# Patient Record
Sex: FEMALE | Race: WHITE | NOT HISPANIC OR LATINO | Employment: UNEMPLOYED | URBAN - METROPOLITAN AREA
[De-identification: names, ages, dates, MRNs, and addresses within clinical notes are randomized per-mention and may not be internally consistent; named-entity substitution may affect disease eponyms.]

---

## 2017-03-13 ENCOUNTER — HOSPITAL ENCOUNTER (EMERGENCY)
Facility: HOSPITAL | Age: 57
Discharge: HOME | End: 2017-03-14
Attending: EMERGENCY MEDICINE | Admitting: EMERGENCY MEDICINE

## 2017-03-13 DIAGNOSIS — F10.929 ALCOHOL INTOXICATION, WITH UNSPECIFIED COMPLICATION (HCC): Primary | ICD-10-CM

## 2017-03-13 LAB
ALBUMIN SERPL-MCNC: 4.2 G/DL (ref 3.5–5.2)
ALBUMIN/GLOB SERPL: 1.5 G/DL
ALP SERPL-CCNC: 84 U/L (ref 39–117)
ALT SERPL W P-5'-P-CCNC: 174 U/L (ref 1–33)
AMPHET+METHAMPHET UR QL: NEGATIVE
ANION GAP SERPL CALCULATED.3IONS-SCNC: 22.6 MMOL/L
APAP SERPL-MCNC: <5 MCG/ML (ref 10–30)
AST SERPL-CCNC: 294 U/L (ref 1–32)
BARBITURATES UR QL SCN: NEGATIVE
BASOPHILS # BLD AUTO: 0.04 10*3/MM3 (ref 0–0.2)
BASOPHILS NFR BLD AUTO: 0.9 % (ref 0–1.5)
BENZODIAZ UR QL SCN: NEGATIVE
BILIRUB SERPL-MCNC: 0.3 MG/DL (ref 0.1–1.2)
BUN BLD-MCNC: 8 MG/DL (ref 6–20)
BUN/CREAT SERPL: 16 (ref 7–25)
CALCIUM SPEC-SCNC: 8.6 MG/DL (ref 8.6–10.5)
CANNABINOIDS SERPL QL: NEGATIVE
CHLORIDE SERPL-SCNC: 103 MMOL/L (ref 98–107)
CO2 SERPL-SCNC: 23.4 MMOL/L (ref 22–29)
COCAINE UR QL: NEGATIVE
CREAT BLD-MCNC: 0.5 MG/DL (ref 0.57–1)
DEPRECATED RDW RBC AUTO: 56.9 FL (ref 37–54)
EOSINOPHIL # BLD AUTO: 0.05 10*3/MM3 (ref 0–0.7)
EOSINOPHIL NFR BLD AUTO: 1.1 % (ref 0.3–6.2)
ERYTHROCYTE [DISTWIDTH] IN BLOOD BY AUTOMATED COUNT: 15.7 % (ref 11.7–13)
ETHANOL BLD-MCNC: 172 MG/DL (ref 0–10)
ETHANOL BLD-MCNC: 429 MG/DL (ref 0–10)
ETHANOL UR QL: 0.17 %
ETHANOL UR QL: 0.43 %
GFR SERPL CREATININE-BSD FRML MDRD: 128 ML/MIN/1.73
GLOBULIN UR ELPH-MCNC: 2.8 GM/DL
GLUCOSE BLD-MCNC: 92 MG/DL (ref 65–99)
GLUCOSE BLDC GLUCOMTR-MCNC: 91 MG/DL (ref 70–130)
HCG SERPL QL: NEGATIVE
HCT VFR BLD AUTO: 41.2 % (ref 35.6–45.5)
HGB BLD-MCNC: 14.2 G/DL (ref 11.9–15.5)
HOLD SPECIMEN: NORMAL
HOLD SPECIMEN: NORMAL
IMM GRANULOCYTES # BLD: 0 10*3/MM3 (ref 0–0.03)
IMM GRANULOCYTES NFR BLD: 0 % (ref 0–0.5)
LYMPHOCYTES # BLD AUTO: 1.46 10*3/MM3 (ref 0.9–4.8)
LYMPHOCYTES NFR BLD AUTO: 31.9 % (ref 19.6–45.3)
MCH RBC QN AUTO: 34 PG (ref 26.9–32)
MCHC RBC AUTO-ENTMCNC: 34.5 G/DL (ref 32.4–36.3)
MCV RBC AUTO: 98.6 FL (ref 80.5–98.2)
METHADONE UR QL SCN: NEGATIVE
MONOCYTES # BLD AUTO: 0.53 10*3/MM3 (ref 0.2–1.2)
MONOCYTES NFR BLD AUTO: 11.6 % (ref 5–12)
NEUTROPHILS # BLD AUTO: 2.5 10*3/MM3 (ref 1.9–8.1)
NEUTROPHILS NFR BLD AUTO: 54.5 % (ref 42.7–76)
OPIATES UR QL: NEGATIVE
OXYCODONE UR QL SCN: NEGATIVE
PLATELET # BLD AUTO: 122 10*3/MM3 (ref 140–500)
PMV BLD AUTO: 10.4 FL (ref 6–12)
POTASSIUM BLD-SCNC: 3.3 MMOL/L (ref 3.5–5.2)
PROT SERPL-MCNC: 7 G/DL (ref 6–8.5)
RBC # BLD AUTO: 4.18 10*6/MM3 (ref 3.9–5.2)
SALICYLATES SERPL-MCNC: <0.3 MG/DL
SODIUM BLD-SCNC: 149 MMOL/L (ref 136–145)
WBC NRBC COR # BLD: 4.58 10*3/MM3 (ref 4.5–10.7)
WHOLE BLOOD HOLD SPECIMEN: NORMAL
WHOLE BLOOD HOLD SPECIMEN: NORMAL

## 2017-03-13 PROCEDURE — 80307 DRUG TEST PRSMV CHEM ANLYZR: CPT | Performed by: PHYSICIAN ASSISTANT

## 2017-03-13 PROCEDURE — 96365 THER/PROPH/DIAG IV INF INIT: CPT

## 2017-03-13 PROCEDURE — 82962 GLUCOSE BLOOD TEST: CPT

## 2017-03-13 PROCEDURE — 25010000002 MAGNESIUM SULFATE PER 500 MG OF MAGNESIUM: Performed by: PHYSICIAN ASSISTANT

## 2017-03-13 PROCEDURE — 99285 EMERGENCY DEPT VISIT HI MDM: CPT

## 2017-03-13 PROCEDURE — 25810000003 DEXTROSE-NACL PER 500 ML: Performed by: PHYSICIAN ASSISTANT

## 2017-03-13 PROCEDURE — 36415 COLL VENOUS BLD VENIPUNCTURE: CPT

## 2017-03-13 PROCEDURE — 84703 CHORIONIC GONADOTROPIN ASSAY: CPT | Performed by: PHYSICIAN ASSISTANT

## 2017-03-13 PROCEDURE — 85025 COMPLETE CBC W/AUTO DIFF WBC: CPT | Performed by: PHYSICIAN ASSISTANT

## 2017-03-13 PROCEDURE — 80053 COMPREHEN METABOLIC PANEL: CPT | Performed by: PHYSICIAN ASSISTANT

## 2017-03-13 PROCEDURE — 25010000002 THIAMINE PER 100 MG: Performed by: PHYSICIAN ASSISTANT

## 2017-03-13 RX ORDER — SODIUM CHLORIDE 0.9 % (FLUSH) 0.9 %
10 SYRINGE (ML) INJECTION AS NEEDED
Status: DISCONTINUED | OUTPATIENT
Start: 2017-03-13 | End: 2017-03-14 | Stop reason: HOSPADM

## 2017-03-13 RX ADMIN — FOLIC ACID 1000 ML/HR: 5 INJECTION, SOLUTION INTRAMUSCULAR; INTRAVENOUS; SUBCUTANEOUS at 15:12

## 2017-03-13 NOTE — ED PROVIDER NOTES
I supervised care provided by the midlevel provider.    We have discussed this patient's history, physical exam, and treatment plan.   I have reviewed the note and personally saw and examined the patient and agree with the plan of care.      Pt with h/o EtOH abuse presents to the ED due to depression and persistent EtOH intoxication. Pt reports that she last drank EtOH PTA. Pt has also had passive suicidal ideations, but has no definite plan. On physical exam, pt appears intoxicated. Heart is RRR. Lungs are CTAB.        PROGRESS NOTES:    2:41 PM: OBSERVATION SERVICES    The patient was admitted to observation status at 14:41 in order to evaluate for risk of ETOH withdrawal and deterioration of condition which may require admission.     Family/Past/Social history: Family hx: pt's brother has diabetes; Past medical hx: depression; Social hx: Pt smokes 1 ppd and drinks ETOH regularly.  Please see emergency department note for additional history and physical.      8:11 PM: Pt's BAL is 429. Course of care turned over to Mode Staton PA-C and Dr. Stewart. Pending pt's sobriety and ACCESS consult to determine disposition.                Documentation assistance provided by Shelbie Ashley. Information recorded by the scribe was done at my direction and has been verified and validated by me.     Entered by Shelbie Ashely, acting as scribe for Dr. Cora MD.             Shelbie Ashley  03/13/17 2013       Olvin Shepherd MD  03/13/17 2024

## 2017-03-13 NOTE — ED NOTES
"Pt reports history of etoh abuse and dependency.  Pt denies intent to harm self or conversation about wanting to harm self. When conversing with EMS pt stated with this writer present \" I wish I would not wake up but I always do.\"  Pt denies specific plan to harm self. Per EMS pt was drinking heavily when they arrived.     Claudia Adhikari RN  03/13/17 9033    "

## 2017-03-13 NOTE — ED NOTES
Pt to room 7. Report to charge nurse Jorge LAGUNA, .  Report to HUGO Perry RN.  Security notified.      Claudia Adhikari RN  03/13/17 6133

## 2017-03-13 NOTE — ED PROVIDER NOTES
EMERGENCY DEPARTMENT ENCOUNTER    CHIEF COMPLAINT  Chief Complaint: SI  History given by: patient   History limited by: intoxication   Room Number: 07/07  PMD: No Known Provider    HPI:  Pt is a 56 y.o. female with a h/o EtOH abuse who presents with SI. Pt reports she has been on her current drinking binge since January. Pt last ate yesterday. Pt is seeking help for EtOH dependency. She has been seen at the [facility] in the past. Pt drank whiskey today and appears intoxicated.     Duration: drinking binge since January  Timing: constant   Location: generalized   Radiation: does not radiate   Quality: recurrent   Intensity/Severity: moderate   Progression: unchanged   Associated Symptoms: none specified   Aggravating Factors: none specified   Alleviating Factors: none specified   Previous Episodes: yes  Treatment before arrival: previously seen at [facility]     MEDICAL RECORD REVIEW  Pt has a h/o anxiety, depression, and alcohol dependence. She does not list any medications. She does not have any previous admissions to [facility], [place], operated by [facility].     PAST MEDICAL HISTORY  Active Ambulatory Problems     Diagnosis Date Noted   • No Active Ambulatory Problems     Resolved Ambulatory Problems     Diagnosis Date Noted   • No Resolved Ambulatory Problems     Past Medical History   Diagnosis Date   • Alcohol abuse    • Anxiety    • Depression        PAST SURGICAL HISTORY  Past Surgical History   Procedure Laterality Date   • Tubal abdominal ligation         FAMILY HISTORY  Family History   Problem Relation Age of Onset   • Family history unknown: Yes       SOCIAL HISTORY  Social History     Social History   • Marital status: Single     Spouse name: N/A   • Number of children: N/A   • Years of education: N/A     Occupational History   • Not on file.     Social History Main Topics   • Smoking status: Heavy Tobacco Smoker     Packs/day: 1.00     Types: Cigarettes   • Smokeless tobacco: Not on file   • Alcohol use Yes   • Drug use: Defer    • Sexual activity: Defer     Other Topics Concern   • Not on file     Social History Narrative   • No narrative on file       ALLERGIES  Review of patient's allergies indicates no known allergies.    REVIEW OF SYSTEMS  Review of Systems   Unable to perform ROS: Other   Psychiatric/Behavioral: Positive for suicidal ideas.        EtOH dependency       PHYSICAL EXAM  ED Triage Vitals   Temp Heart Rate Resp BP SpO2   03/13/17 1323 03/13/17 1323 03/13/17 1323 03/13/17 1323 03/13/17 1323   96.1 °F (35.6 °C) 82 19 119/80 96 %      Temp src Heart Rate Source Patient Position BP Location FiO2 (%)   03/13/17 1323 03/13/17 1323 03/13/17 1323 03/13/17 1323 --   Tympanic Monitor Lying Right arm        Physical Exam   Constitutional: She is well-developed, well-nourished, and in no distress. No distress.   Smells of EtOH. Slurs speech.    HENT:   Head: Normocephalic and atraumatic.   Mouth/Throat: Mucous membranes are dry.   Eyes: EOM are normal.   Neck: Normal range of motion. Neck supple.   Cardiovascular: Normal rate and regular rhythm.    HR: 94  BP: 113/78   Pulmonary/Chest: Effort normal and breath sounds normal. No respiratory distress. She has no wheezes. She exhibits no tenderness.   O2 sats: 99% on room air.    Abdominal: Soft. She exhibits no distension. There is no tenderness. There is no rebound.   Musculoskeletal: Normal range of motion. She exhibits no edema.   Lymphadenopathy:     She has no cervical adenopathy.   Neurological: She is alert.   Skin: Skin is warm and dry. No rash noted. No pallor.   Nursing note and vitals reviewed.      LAB RESULTS  Recent Results (from the past 24 hour(s))   Comprehensive Metabolic Panel    Collection Time: 03/13/17  1:53 PM   Result Value Ref Range    Glucose 92 65 - 99 mg/dL    BUN 8 6 - 20 mg/dL    Creatinine 0.50 (L) 0.57 - 1.00 mg/dL    Sodium 149 (H) 136 - 145 mmol/L    Potassium 3.3 (L) 3.5 - 5.2 mmol/L    Chloride 103 98 - 107 mmol/L    CO2 23.4 22.0 - 29.0 mmol/L     Calcium 8.6 8.6 - 10.5 mg/dL    Total Protein 7.0 6.0 - 8.5 g/dL    Albumin 4.20 3.50 - 5.20 g/dL    ALT (SGPT) 174 (H) 1 - 33 U/L    AST (SGOT) 294 (H) 1 - 32 U/L    Alkaline Phosphatase 84 39 - 117 U/L    Total Bilirubin 0.3 0.1 - 1.2 mg/dL    eGFR Non African Amer 128 >60 mL/min/1.73    Globulin 2.8 gm/dL    A/G Ratio 1.5 g/dL    BUN/Creatinine Ratio 16.0 7.0 - 25.0    Anion Gap 22.6 mmol/L   Acetaminophen Level    Collection Time: 03/13/17  1:53 PM   Result Value Ref Range    Acetaminophen <5.0 (L) 10.0 - 30.0 mcg/mL   Ethanol    Collection Time: 03/13/17  1:53 PM   Result Value Ref Range    Ethanol 429 (C) 0 - 10 mg/dL    Ethanol % 0.429 %   Salicylate Level    Collection Time: 03/13/17  1:53 PM   Result Value Ref Range    Salicylate <0.3 mg/dL   hCG, Serum, Qualitative    Collection Time: 03/13/17  1:53 PM   Result Value Ref Range    HCG Qualitative Negative Indeterminate, Negative   CBC Auto Differential    Collection Time: 03/13/17  1:53 PM   Result Value Ref Range    WBC 4.58 4.50 - 10.70 10*3/mm3    RBC 4.18 3.90 - 5.20 10*6/mm3    Hemoglobin 14.2 11.9 - 15.5 g/dL    Hematocrit 41.2 35.6 - 45.5 %    MCV 98.6 (H) 80.5 - 98.2 fL    MCH 34.0 (H) 26.9 - 32.0 pg    MCHC 34.5 32.4 - 36.3 g/dL    RDW 15.7 (H) 11.7 - 13.0 %    RDW-SD 56.9 (H) 37.0 - 54.0 fl    MPV 10.4 6.0 - 12.0 fL    Platelets 122 (L) 140 - 500 10*3/mm3    Neutrophil % 54.5 42.7 - 76.0 %    Lymphocyte % 31.9 19.6 - 45.3 %    Monocyte % 11.6 5.0 - 12.0 %    Eosinophil % 1.1 0.3 - 6.2 %    Basophil % 0.9 0.0 - 1.5 %    Immature Grans % 0.0 0.0 - 0.5 %    Neutrophils, Absolute 2.50 1.90 - 8.10 10*3/mm3    Lymphocytes, Absolute 1.46 0.90 - 4.80 10*3/mm3    Monocytes, Absolute 0.53 0.20 - 1.20 10*3/mm3    Eosinophils, Absolute 0.05 0.00 - 0.70 10*3/mm3    Basophils, Absolute 0.04 0.00 - 0.20 10*3/mm3    Immature Grans, Absolute 0.00 0.00 - 0.03 10*3/mm3   Urine Drug Screen    Collection Time: 03/13/17  1:54 PM   Result Value Ref Range     "Amphet/Methamphet, Screen Negative Negative    Barbiturates Screen, Urine Negative Negative    Benzodiazepine Screen, Urine Negative Negative    Cocaine Screen, Urine Negative Negative    Opiate Screen Negative Negative    THC, Screen, Urine Negative Negative    Methadone Screen, Urine Negative Negative    Oxycodone Screen, Urine Negative Negative   POC Glucose Fingerstick    Collection Time: 03/13/17  2:02 PM   Result Value Ref Range    Glucose 91 70 - 130 mg/dL     I ordered the above labs and reviewed the results    COURSE & MEDICAL DECISION MAKING  Pertinent Labs that were ordered and reviewed are noted above.  Results were reviewed/discussed with the patient and they were also made aware of online assess.  Pt also made aware that some labs, such as cultures, will not be resulted during ER visit and follow up with PMD is necessary.       PROGRESS AND CONSULTS    Progress Notes:    1430: Reviewed pt's history and workup with Dr. Shepherd.  After a bedside evaluation; Dr. Shepherd agrees with the plan of care.    1623: Called lab regarding KOFI. They are having problems with their machine and will have results as soon as possible.     1630: Based on KOFI, pt will be legally sober for psych eval in 16 hours.    1800: Pt care turned over to Dr. Shepherd pending sobriety and psych eval.      MEDICATIONS GIVEN IN ER  Medications   sodium chloride 0.9 % flush 10 mL (not administered)   multiple vitamin (M.V.I. Adult) 10 mL, thiamine (B-1) 100 mg, folic acid 1 mg, magnesium sulfate 2 g in dextrose 5 % and sodium chloride 0.9 % 1,000 mL infusion (1,000 mL/hr Intravenous New Bag 3/13/17 1512)       Visit Vitals   • /75   • Pulse 96   • Temp 96.1 °F (35.6 °C) (Tympanic)   • Resp 16   • Ht 61\" (154.9 cm)   • Wt 135 lb (61.2 kg)   • SpO2 97%   • BMI 25.51 kg/m2         DIAGNOSIS  Final diagnoses:   None       FOLLOW UP   No follow-up provider specified.    RX     Medication List      Notice     No changes were made to " your prescriptions during this visit.        I personally scribed for Adriana Hernandez PA-C on 3/13/2017 at 5:14 PM.  Electronically signed by Shirlene Greene on 3/13/2017 at time 5:14 PM           Shirlene Greene  03/13/17 8591       Adriana Hernandez PA-C  03/13/17 6718

## 2017-03-14 VITALS
RESPIRATION RATE: 18 BRPM | DIASTOLIC BLOOD PRESSURE: 106 MMHG | SYSTOLIC BLOOD PRESSURE: 145 MMHG | TEMPERATURE: 99 F | WEIGHT: 135 LBS | HEIGHT: 61 IN | OXYGEN SATURATION: 94 % | HEART RATE: 86 BPM | BODY MASS INDEX: 25.49 KG/M2

## 2017-03-14 PROCEDURE — 90791 PSYCH DIAGNOSTIC EVALUATION: CPT

## 2017-03-14 NOTE — ED PROVIDER NOTES
0130  Checked pt who is resting comfortably. D/w pt plan to order etoh level and have Access consult once pt is clinically sober. Pt states she wants help with her alcoholism but denies SI or intent to harm herself. Pt understands and agrees with the plan. All questions answered.  BP: 142/82 HR: 80 Temp: 96.1 °F (35.6 °C) (Tympanic) O2 sat: 93%    0400  Patient discharged from Observation Status.  Will follow up with [facility] for further care and help with alcohol abuse.    Documentation assistance provided by leonard Keller for SUSIE Staton.  Information recorded by the leonard was done at my direction and has been verified and validated by me.     Samuel Keller  03/14/17 8022       SUSIE Zamarripa III  03/14/17 2101       SUSIE Zamarripa III  03/14/17 [id number]

## 2017-03-14 NOTE — CONSULTS
"Pt states her friend called EMS due to her level of alcohol intoxication. Pt volunteers that he couldn't drive her here himself, because \"he was intoxicated too\" and \"I didn't want him to get in trouble\" (good judgement).     \"I've not been thinking about ways of harming myself, I just said that sometimes I wish I wouldn't wake up ... I would never harm myself, people do die in their sleep\". Pt states staff \"hear that and they think I want to kill myself, and that's not right\". Asked what she would do if she leaves here tonight, states she is willing to go to the healing place. Engages in interaction about how to get there, indicating intention to do so. States she called the manager there in the past, and was told she would have to get herself there. States she was there 9/2014 - 4/2015 (8 months), states she is now ready to check herself back in, partly due to, \"I don't have anywhere to go, it's a homeless shelter\".     Denies any hx of intentional self harm behavior. Asked to identify a reason NOT to hurt herself, marlenys, \"because I don't want to\". Denies any recent thoughts of harming others. Reports hx of inpatient psych admission about 3 months ago, [place] in [facility], for \"a couple days, 3 at the most\". States [facility] [place], last appointment about 2 months ago, for depression. States she is out of zoloft and thinks the other medication is buspirone.     States her last AA attendance was about 1 week ago. Denies street drug use.     States she went through 30 days without drinking, ending in January, 2017. Denies hx of seizures or hallucinations when withdrawing from alcohol, but noted to have mild tremor and current HR on monitor of 104/min now.     Pt states she has been splitting a half gallon of whiskey daily with one other person (about 1/4 gallon daily).     Kavya for safety if she leaves here tonight.     Plan to d/c with recommendation to go to " the [facility] detox center. Dr. Vazquez agrees with plan. Dr. Vazquez and SUSIE Staton advise me that pt does not meet criteria for medical admission for ETOH detox.